# Patient Record
Sex: FEMALE | Race: ASIAN
[De-identification: names, ages, dates, MRNs, and addresses within clinical notes are randomized per-mention and may not be internally consistent; named-entity substitution may affect disease eponyms.]

---

## 2024-06-10 PROBLEM — Z00.00 ENCOUNTER FOR PREVENTIVE HEALTH EXAMINATION: Status: ACTIVE | Noted: 2024-06-10

## 2024-06-24 ENCOUNTER — APPOINTMENT (OUTPATIENT)
Dept: ANTEPARTUM | Facility: CLINIC | Age: 19
End: 2024-06-24

## 2024-06-24 ENCOUNTER — LABORATORY RESULT (OUTPATIENT)
Age: 19
End: 2024-06-24

## 2024-06-24 ENCOUNTER — ASOB RESULT (OUTPATIENT)
Age: 19
End: 2024-06-24

## 2024-06-24 PROCEDURE — 76801 OB US < 14 WKS SINGLE FETUS: CPT | Mod: 59

## 2024-06-24 PROCEDURE — 76813 OB US NUCHAL MEAS 1 GEST: CPT

## 2024-06-24 PROCEDURE — 36415 COLL VENOUS BLD VENIPUNCTURE: CPT

## 2024-08-12 ENCOUNTER — APPOINTMENT (OUTPATIENT)
Dept: ANTEPARTUM | Facility: CLINIC | Age: 19
End: 2024-08-12
Payer: COMMERCIAL

## 2024-08-12 ENCOUNTER — ASOB RESULT (OUTPATIENT)
Age: 19
End: 2024-08-12

## 2024-08-12 ENCOUNTER — NON-APPOINTMENT (OUTPATIENT)
Age: 19
End: 2024-08-12

## 2024-08-12 PROCEDURE — 76805 OB US >/= 14 WKS SNGL FETUS: CPT

## 2025-01-03 ENCOUNTER — APPOINTMENT (OUTPATIENT)
Dept: ANTEPARTUM | Facility: CLINIC | Age: 20
End: 2025-01-03

## 2025-01-03 ENCOUNTER — OUTPATIENT (OUTPATIENT)
Dept: INPATIENT UNIT | Facility: HOSPITAL | Age: 20
LOS: 1 days | Discharge: HOME | End: 2025-01-03
Payer: COMMERCIAL

## 2025-01-03 VITALS
TEMPERATURE: 99 F | HEART RATE: 93 BPM | SYSTOLIC BLOOD PRESSURE: 120 MMHG | RESPIRATION RATE: 15 BRPM | DIASTOLIC BLOOD PRESSURE: 67 MMHG

## 2025-01-03 DIAGNOSIS — O26.899 OTHER SPECIFIED PREGNANCY RELATED CONDITIONS, UNSPECIFIED TRIMESTER: ICD-10-CM

## 2025-01-03 PROCEDURE — 59025 FETAL NON-STRESS TEST: CPT | Mod: 26

## 2025-01-03 PROCEDURE — 99212 OFFICE O/P EST SF 10 MIN: CPT | Mod: 25

## 2025-01-04 VITALS — DIASTOLIC BLOOD PRESSURE: 66 MMHG | SYSTOLIC BLOOD PRESSURE: 102 MMHG | HEART RATE: 99 BPM

## 2025-01-04 NOTE — OB PROVIDER TRIAGE NOTE - HISTORY OF PRESENT ILLNESS
20 y/o  at 39w5d GA, SUZANNE 25, presenting with c/o contractions x4 hours.  Patient states that she was no sure if contractions were travis jules or not so she came in for evaluation.  Patient states that she feels contractions q5 mins, rates pain 7 out of 10 in severity.  Endorses good fetal movement. Denies: fever, vaginal bleeding, leakage of fluid, abdominal pain, nausea/vomiting.    Primary OB care with [facility]  Patient was seen in the office on , was 1cm dilated at that time  Uncomplicated pregnancy course  GBS: negative

## 2025-01-04 NOTE — OB PROVIDER TRIAGE NOTE - NSHPPHYSICALEXAM_GEN_ALL_CORE
ICU Vital Signs Last 24 Hrs  T(C): 37.1 (04 Jan 2025 00:29), Max: 37.1 (03 Jan 2025 23:40)  T(F): 98.78 (04 Jan 2025 00:29), Max: 98.8 (03 Jan 2025 23:40)  HR: 105 (04 Jan 2025 00:33) (93 - 105)  BP: 111/71 (04 Jan 2025 00:33) (111/71 - 120/67)  RR: 15 (04 Jan 2025 00:29) (15 - 15)    General: Patient sitting comfortably in bed, A&Ox3, in no acute distress  Abd: soft, non-tender, gravid, TOCO in place  Bedside Transabdominal Sonogram: Vertex presentation, posterior placenta, +FH noted, M mode 155 BPM, TIFFANI 15.22cm  EFM: 140 BPM baseline, moderate variability, +accels, - decels, category I tracing, reactive NST  [place]: Irregular contractions  SVE: 1/50/-3  Ext:  FROM bilateral no edema  Neuro: grossly intact

## 2025-01-04 NOTE — OB PROVIDER TRIAGE NOTE - NSOBPROVIDERNOTE_OBGYN_ALL_OB_FT
A: 18 y/o  @ 39w5d GA in early latent labor. GBS negative.    P: No evidence of acute process at this time     SVE unchanged from previous exam     Patient comfortable, in no acute distress     BPP  TIFFANI 15.22cm     EFM: Cat I tracing, reactive NST     [place]: Irregular ctx     Reassuring fetal/maternal status     Stable for discharge     Return precautions provided    Discharged @ 0121  - Patient to be discharged home with follow up and return precautions  - Please follow up with your obstetrician at your next scheduled appointment.   - Please return for decreased / no fetal movement, vaginal bleeding similar to that of a period, leaking / gush of fluid, regular contractions occurring 4-5 minutes  for one hour or requiring pain medication   - Patient educated of plan and demonstrates understanding. All questions answered. Discharge instructions provided and signed    Case discussed with Dr Ivis Laurent PA-C

## 2025-01-04 NOTE — OB PROVIDER TRIAGE NOTE - NS_OBGYNHISTORY_OBGYN_ALL_OB_FT
OB History: Primigravida    GYN History: Denies history of endometriosis/uterine fibroids/cysts/abnormal pap smears/STIs.

## 2025-01-06 ENCOUNTER — APPOINTMENT (OUTPATIENT)
Dept: ANTEPARTUM | Facility: CLINIC | Age: 20
End: 2025-01-06
Payer: COMMERCIAL

## 2025-01-06 ENCOUNTER — TRANSCRIPTION ENCOUNTER (OUTPATIENT)
Age: 20
End: 2025-01-06

## 2025-01-06 ENCOUNTER — INPATIENT (INPATIENT)
Facility: HOSPITAL | Age: 20
LOS: 1 days | Discharge: HOME | End: 2025-01-08
Attending: OBSTETRICS & GYNECOLOGY | Admitting: OBSTETRICS & GYNECOLOGY

## 2025-01-06 ENCOUNTER — ASOB RESULT (OUTPATIENT)
Age: 20
End: 2025-01-06

## 2025-01-06 VITALS — TEMPERATURE: 98 F

## 2025-01-06 DIAGNOSIS — O26.899 OTHER SPECIFIED PREGNANCY RELATED CONDITIONS, UNSPECIFIED TRIMESTER: ICD-10-CM

## 2025-01-06 DIAGNOSIS — O42.90 PREMATURE RUPTURE OF MEMBRANES, UNSPECIFIED AS TO LENGTH OF TIME BETWEEN RUPTURE AND ONSET OF LABOR, UNSPECIFIED WEEKS OF GESTATION: ICD-10-CM

## 2025-01-06 LAB
BASOPHILS # BLD AUTO: 0.01 K/UL — SIGNIFICANT CHANGE UP (ref 0–0.2)
BASOPHILS NFR BLD AUTO: 0.1 % — SIGNIFICANT CHANGE UP (ref 0–2)
BLD GP AB SCN SERPL QL: NEGATIVE — SIGNIFICANT CHANGE UP
EOSINOPHIL # BLD AUTO: 0.11 K/UL — SIGNIFICANT CHANGE UP (ref 0–0.5)
EOSINOPHIL NFR BLD AUTO: 1.2 % — SIGNIFICANT CHANGE UP (ref 0–6)
HCT VFR BLD CALC: 31.4 % — LOW (ref 34.5–45)
HGB BLD-MCNC: 9.2 G/DL — LOW (ref 11.5–15.5)
IANC: 6.14 K/UL — SIGNIFICANT CHANGE UP (ref 1.8–7.4)
IMM GRANULOCYTES NFR BLD AUTO: 0.7 % — SIGNIFICANT CHANGE UP (ref 0–0.9)
LYMPHOCYTES # BLD AUTO: 1.97 K/UL — SIGNIFICANT CHANGE UP (ref 1–3.3)
LYMPHOCYTES # BLD AUTO: 21 % — SIGNIFICANT CHANGE UP (ref 13–44)
MCHC RBC-ENTMCNC: 19.2 PG — LOW (ref 27–34)
MCHC RBC-ENTMCNC: 29.3 G/DL — LOW (ref 32–36)
MCV RBC AUTO: 65.4 FL — LOW (ref 80–100)
MONOCYTES # BLD AUTO: 1.09 K/UL — HIGH (ref 0–0.9)
MONOCYTES NFR BLD AUTO: 11.6 % — SIGNIFICANT CHANGE UP (ref 2–14)
NEUTROPHILS # BLD AUTO: 6.14 K/UL — SIGNIFICANT CHANGE UP (ref 1.8–7.4)
NEUTROPHILS NFR BLD AUTO: 65.4 % — SIGNIFICANT CHANGE UP (ref 43–77)
NRBC # BLD: 0 /100 WBCS — SIGNIFICANT CHANGE UP (ref 0–0)
NRBC # FLD: 0 K/UL — SIGNIFICANT CHANGE UP (ref 0–0)
PLATELET # BLD AUTO: 253 K/UL — SIGNIFICANT CHANGE UP (ref 150–400)
RBC # BLD: 4.8 M/UL — SIGNIFICANT CHANGE UP (ref 3.8–5.2)
RBC # FLD: 17.4 % — HIGH (ref 10.3–14.5)
RH IG SCN BLD-IMP: POSITIVE — SIGNIFICANT CHANGE UP
RH IG SCN BLD-IMP: POSITIVE — SIGNIFICANT CHANGE UP
T PALLIDUM AB TITR SER: NEGATIVE — SIGNIFICANT CHANGE UP
WBC # BLD: 9.39 K/UL — SIGNIFICANT CHANGE UP (ref 3.8–10.5)
WBC # FLD AUTO: 9.39 K/UL — SIGNIFICANT CHANGE UP (ref 3.8–10.5)

## 2025-01-06 PROCEDURE — 76815 OB US LIMITED FETUS(S): CPT | Mod: 26

## 2025-01-06 RX ORDER — SIMETHICONE 125 MG/1
80 CAPSULE, LIQUID FILLED ORAL EVERY 4 HOURS
Refills: 0 | Status: DISCONTINUED | OUTPATIENT
Start: 2025-01-06 | End: 2025-01-08

## 2025-01-06 RX ORDER — OXYTOCIN IN 5 % DEXTROSE 20/500ML
167 PLASTIC BAG, INJECTION (ML) INTRAVENOUS
Qty: 30 | Refills: 0 | Status: DISCONTINUED | OUTPATIENT
Start: 2025-01-06 | End: 2025-01-06

## 2025-01-06 RX ORDER — CHLORHEXIDINE GLUCONATE 1.2 MG/ML
1 RINSE ORAL DAILY
Refills: 0 | Status: DISCONTINUED | OUTPATIENT
Start: 2025-01-06 | End: 2025-01-06

## 2025-01-06 RX ORDER — FERROUS SULFATE 325(65) MG
0 TABLET ORAL
Refills: 0 | DISCHARGE

## 2025-01-06 RX ORDER — IBUPROFEN 200 MG
1 TABLET ORAL
Qty: 0 | Refills: 0 | DISCHARGE
Start: 2025-01-06

## 2025-01-06 RX ORDER — ACETAMINOPHEN 80 MG/.8ML
3 SOLUTION/ DROPS ORAL
Qty: 0 | Refills: 0 | DISCHARGE
Start: 2025-01-06

## 2025-01-06 RX ORDER — ACETAMINOPHEN 80 MG/.8ML
975 SOLUTION/ DROPS ORAL
Refills: 0 | Status: DISCONTINUED | OUTPATIENT
Start: 2025-01-06 | End: 2025-01-08

## 2025-01-06 RX ORDER — HYDROCORTISONE 1 %
1 CREAM (GRAM) TOPICAL EVERY 6 HOURS
Refills: 0 | Status: DISCONTINUED | OUTPATIENT
Start: 2025-01-06 | End: 2025-01-08

## 2025-01-06 RX ORDER — OXYCODONE HCL 15 MG
5 TABLET ORAL ONCE
Refills: 0 | Status: DISCONTINUED | OUTPATIENT
Start: 2025-01-06 | End: 2025-01-08

## 2025-01-06 RX ORDER — WITCH HAZEL 0.5 ML/ML
1 SOLUTION TOPICAL
Qty: 0 | Refills: 0 | DISCHARGE
Start: 2025-01-06

## 2025-01-06 RX ORDER — OXYTOCIN IN 5 % DEXTROSE 20/500ML
10 PLASTIC BAG, INJECTION (ML) INTRAVENOUS ONCE
Refills: 0 | Status: COMPLETED | OUTPATIENT
Start: 2025-01-06 | End: 2025-01-06

## 2025-01-06 RX ORDER — SODIUM CHLORIDE 9 MG/ML
500 INJECTION, SOLUTION INTRAVENOUS ONCE
Refills: 0 | Status: DISCONTINUED | OUTPATIENT
Start: 2025-01-06 | End: 2025-01-08

## 2025-01-06 RX ORDER — WITCH HAZEL 0.5 ML/ML
1 SOLUTION TOPICAL EVERY 4 HOURS
Refills: 0 | Status: DISCONTINUED | OUTPATIENT
Start: 2025-01-06 | End: 2025-01-08

## 2025-01-06 RX ORDER — OXYTOCIN IN 5 % DEXTROSE 20/500ML
PLASTIC BAG, INJECTION (ML) INTRAVENOUS
Qty: 30 | Refills: 0 | Status: DISCONTINUED | OUTPATIENT
Start: 2025-01-06 | End: 2025-01-06

## 2025-01-06 RX ORDER — PNV/FERROUS FUM/DOCUSATE/FOLIC 90-50-1MG
0 TABLET, EXTENDED RELEASE ORAL
Refills: 0 | DISCHARGE

## 2025-01-06 RX ORDER — IBUPROFEN 200 MG
600 TABLET ORAL EVERY 6 HOURS
Refills: 0 | Status: COMPLETED | OUTPATIENT
Start: 2025-01-06 | End: 2025-12-05

## 2025-01-06 RX ORDER — PNV/FERROUS FUM/DOCUSATE/FOLIC 90-50-1MG
1 TABLET, EXTENDED RELEASE ORAL DAILY
Refills: 0 | Status: DISCONTINUED | OUTPATIENT
Start: 2025-01-06 | End: 2025-01-08

## 2025-01-06 RX ORDER — BENZOCAINE/MENTH/CETYLPYRD CL 15 MG-4 MG
1 LOZENGE MUCOUS MEMBRANE EVERY 6 HOURS
Refills: 0 | Status: DISCONTINUED | OUTPATIENT
Start: 2025-01-06 | End: 2025-01-08

## 2025-01-06 RX ORDER — DIPHENHYDRAMINE HCL 25 MG
25 TABLET ORAL EVERY 6 HOURS
Refills: 0 | Status: DISCONTINUED | OUTPATIENT
Start: 2025-01-06 | End: 2025-01-08

## 2025-01-06 RX ORDER — LANOLIN
1 OINTMENT (GRAM) TOPICAL EVERY 6 HOURS
Refills: 0 | Status: DISCONTINUED | OUTPATIENT
Start: 2025-01-06 | End: 2025-01-08

## 2025-01-06 RX ORDER — IRON ASPGLY,PS/C/B12/FA/CA/SUC 150-25-1
1 CAPSULE ORAL
Refills: 0 | DISCHARGE

## 2025-01-06 RX ORDER — SODIUM CHLORIDE 9 MG/ML
3 INJECTION, SOLUTION INTRAMUSCULAR; INTRAVENOUS; SUBCUTANEOUS EVERY 8 HOURS
Refills: 0 | Status: DISCONTINUED | OUTPATIENT
Start: 2025-01-06 | End: 2025-01-08

## 2025-01-06 RX ORDER — KETOROLAC TROMETHAMINE 30 MG/ML
30 INJECTION INTRAMUSCULAR; INTRAVENOUS ONCE
Refills: 0 | Status: DISCONTINUED | OUTPATIENT
Start: 2025-01-06 | End: 2025-01-06

## 2025-01-06 RX ORDER — CLOSTRIDIUM TETANI TOXOID ANTIGEN (FORMALDEHYDE INACTIVATED), CORYNEBACTERIUM DIPHTHERIAE TOXOID ANTIGEN (FORMALDEHYDE INACTIVATED), BORDETELLA PERTUSSIS TOXOID ANTIGEN (GLUTARALDEHYDE INACTIVATED), BORDETELLA PERTUSSIS FILAMENTOUS HEMAGGLUTININ ANTIGEN (FORMALDEHYDE INACTIVATED), BORDETELLA PERTUSSIS PERTACTIN ANTIGEN, AND BORDETELLA PERTUSSIS FIMBRIAE 2/3 ANTIGEN 5; 2; 2.5; 5; 3; 5 [LF]/.5ML; [LF]/.5ML; UG/.5ML; UG/.5ML; UG/.5ML; UG/.5ML
0.5 INJECTION, SUSPENSION INTRAMUSCULAR ONCE
Refills: 0 | Status: DISCONTINUED | OUTPATIENT
Start: 2025-01-06 | End: 2025-01-08

## 2025-01-06 RX ORDER — MAGNESIUM HYDROXIDE 400 MG/5ML
30 SUSPENSION, ORAL (FINAL DOSE FORM) ORAL
Refills: 0 | Status: DISCONTINUED | OUTPATIENT
Start: 2025-01-06 | End: 2025-01-08

## 2025-01-06 RX ORDER — OXYCODONE HCL 15 MG
5 TABLET ORAL
Refills: 0 | Status: DISCONTINUED | OUTPATIENT
Start: 2025-01-06 | End: 2025-01-08

## 2025-01-06 RX ORDER — SODIUM CHLORIDE 9 MG/ML
1000 INJECTION, SOLUTION INTRAVENOUS
Refills: 0 | Status: DISCONTINUED | OUTPATIENT
Start: 2025-01-06 | End: 2025-01-06

## 2025-01-06 RX ORDER — OXYTOCIN IN 5 % DEXTROSE 20/500ML
167 PLASTIC BAG, INJECTION (ML) INTRAVENOUS
Qty: 30 | Refills: 0 | Status: COMPLETED | OUTPATIENT
Start: 2025-01-06 | End: 2025-01-06

## 2025-01-06 RX ADMIN — SODIUM CHLORIDE 125 MILLILITER(S): 9 INJECTION, SOLUTION INTRAVENOUS at 07:59

## 2025-01-06 RX ADMIN — Medication 10 UNIT(S): at 16:48

## 2025-01-06 RX ADMIN — Medication 2 MILLIUNIT(S)/MIN: at 11:11

## 2025-01-06 RX ADMIN — CHLORHEXIDINE GLUCONATE 1 APPLICATION(S): 1.2 RINSE ORAL at 08:00

## 2025-01-06 RX ADMIN — Medication 167 MILLIUNIT(S)/MIN: at 16:48

## 2025-01-06 NOTE — OB PROVIDER H&P - HISTORY OF PRESENT ILLNESS
20y/o  @40.1wks presents with leaking of clear fluid since 350am, and cramping, pain scale 10/10  Reports good fetal movement  Denies VB    Allergies: Almonds- SOB  Medications: PNV, Iron  NPO 12am

## 2025-01-06 NOTE — OB RN PATIENT PROFILE - FALL HARM RISK - PATIENT NEEDS ASSISTANCE
POC reviewed with mom. Questions answered, verbalized understanding.     Resp status stable on home Astral vent. Pt breathing comfortably with no increased WOB noted. Requiring minimal suctioning. Positional airleak noted. Coarse breath sounds throughout. Plan is to possibly trial HME tomorrow. Environmentally warm throughout shift with tmax in the 99-100F range. Pt cools down with fan application and unswaddling. Pt irritable in the beginning of the shift but settled as the day went on. Prn tylenol x1 for irritability. MARISOL scores of 1 for time to gain calm state. Weaned ativan dose to 0.46mg--pt tolerated well. Continuing methadone q8. VSS. Perfusion adequate. Bumex and diuril continued q8, pt voiding well. Feeds of monogen 26kcal with 4meq NaCl and 2meq Kcl continued. Multiple BM throughout shift. Prn simethicone x1 for gas. Plan is for parents to room in this weekend. See flowsheets for more assessment info.    No assistance needed

## 2025-01-06 NOTE — DISCHARGE NOTE OB - PATIENT PORTAL LINK FT
You can access the FollowMyHealth Patient Portal offered by [facility] by registering at the following website: [email or web address]. By joining [employer]’s FollowMyHealth portal, you will also be able to view your health information using other applications (apps) compatible with our system.

## 2025-01-06 NOTE — OB PROVIDER DELIVERY SUMMARY - NSSELHIDDEN_OBGYN_ALL_OB_FT
[NS_DeliveryAttending1_OBGYN_ALL_OB_FT:[id number]=],[NS_DeliveryRN_OBGYN_ALL_OB_FT:[id number]=],[NS_DeliveryAssist1_OBGYN_ALL_OB_FT:[id number]]

## 2025-01-06 NOTE — OB PROVIDER LABOR PROGRESS NOTE - ASSESSMENT
CNM OB Progress Note    Patient seen and evaluated at bedside.  Denies complaints.  Comfortable w/ anesthesia epidural.      T(C): 37.0 (01-06-25 @ 14:00), Max: 37.2 (01-06-25 @ 10:00)  HR: 95 (01-06-25 @ 15:14) (78 - 107)  BP: 120/66 (01-06-25 @ 14:40) (86/58 - 125/83)  RR: 14 (01-06-25 @ 14:00) (14 - 18)  SpO2: 100% (01-06-25 @ 15:14) (99% - 100%)    SVE: 3/90/-2       -Labor: intermittent cat 2 tracing with periods of minimal variability  -Analgesia: anesthesia epidural  -Induction with: iv pitocin    pitocin at 6u, unable to increase pitocin for ctx pattern  IUPC placement discussed with patient, agree to plan  IUPC Placed  Reposition PRN  fluids remain clear      -Continue to monitor with EFM & IUPC  -Recheck patient in 2-4 hours or PRN    Discussed with MD Juan Daniel Dougherty

## 2025-01-06 NOTE — OB PROVIDER DELIVERY SUMMARY - NSPROVIDERDELIVERYNOTE_OBGYN_ALL_OB_FT
Patient found to be fully dilated and directed to push.  Spontaneous vaginal delivery of viable liveborn female infant.   Head, shoulders, and body delivered without complications.  Infant was suctioned and passed to mother.   Delayed cord clamping performed, then clamped and cut.   Placenta delivered intact with a 3-vessel cord.   Fundal massage was given and uterine fundus was noted to have atony, vigorous bimanual fundal massage given, clots expressed, intermittent atony noted. IM Pitocin given, Pitocin bolus infusing. Uterus noted to be firm.   Vaginal exam revealed an intact cervix, vaginal walls, and sulci.   Patient has a 1st degree laceration that was repaired with 2-0 chromic suture.   Excellent hemostasis noted.  Patient was stable and started postpartum recovery in room.   Count was correct x 2.     Infant: female  Apgar: 9/9  QBL: 287ml

## 2025-01-06 NOTE — OB PROVIDER H&P - PROBLEM SELECTOR PLAN 1
Admit for PROM @40.1wks  D/W Dr. Garcia  Routine Orders  IOL Buccal Cytotec  Epidural as needed for pain   GBS Negative 2024    Risks, benefits, alternatives, and possible complications have been discussed in detail with the patient in her native language. Pre-admission, admission, and post admission procedures and expectations were discussed in detail. All questions answered, all appropriate hospital consents were signed. Anticipate normal vaginal delivery.   Informed consent was obtained. The following was discussed:   - Induction/augmentation of labor: use of medication and/or cook balloon to begin or enhance labor   - Obstetrical management including internal fetal/contraction monitoring   - Normal vaginal delivery   - Possible  section
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
## 2025-01-06 NOTE — OB RN PATIENT PROFILE - DOES PATIENT HAVE ADVANCE DIRECTIVE
Patient:  Dalia Bearden [facility] #:  [id number]   Date of Note: 04/24/2023   Referring Physician:  Mary Siddiqi PA-C  Diagnosis:    Encounter Diagnosis   Name Primary?    Chronic left shoulder pain Yes     Progress/Treatment note     Start Time: 2:05 pm  End Time: 2:59 pm  Total Time: 54 min    Subjective:   Pt reporting she is doing a lot better than Friday.     Pain: 4 out of 10 left shoulder    Objective:   Patient seen by ALBERT this session. Treatment  consist of the following: manual therapy/joint mobilization, Therapeutic exercises, Therapeutic activities, and Neuromuscular re-education    Treatment:    -Reassessment  - Finger ladder x 10 abduction top increase ROM  - Wall clocks with red theraband x 10 each, increase stabilization and strength of musculature  - Shoulder arc with use of 1.5# cuff wt x 3 each with left shoulder to increase strength  - ABC with 2# wt with LUE, increase stabilization and strength of musculature  - 6 min omnicycle level 5 (3.5 forward and 3.5 min backwards)   -isometric ball on wall 6 way 2 x 10 each direction  -2lb ball on wall rotations in shoulder flexion and abduction 30 each direction    Assessment:  Patient reporting no pain or problems this day during therapy interventions. Pt tolerating interventions well without reports of pain with rest breaks throughout. Pt able to complete all ax without v/c for proper form and positioning.Occupational therapist /ALBERT collaboration to discuss POC, improvements, and d/c planning on todays date. Patient reports compliant with home exercise program.     Pt will continue to benefit from skilled OT intervention. Patient continues to demonstrate limitation  with  ROM, Joint mobility, Stiffness, Decreased functional use, Decreased strength, Continued pain, and Continued inflammation     Goals to be met in 6 weeks:  1) Pt will be independent and report performing HEP to maximize (L) shoulder functional capacity. Ongoing/ MET  2) Pt  will increase L shoulder AROM in all measured planes of motion by 5-10 degrees with daily task. Ongoing/ MET  3) Pt will increase ability to move/handle objects AEB decreased score on QuickDASH <=40.9% (currently 27.3%) Ongoing/ MET  4) Pt will report one degree less of pain with use. Ongoing/ MET  5) Pt will report use of home modalities for pain management. Ongoing/ MET     New goals to be completed in 6 weeks:  1) Pt will be independent and report performing HEP to maximize (L) shoulder functional capacity.   2) Pt will increase L shoulder AROM in all measured planes of motion by 3-5 degrees with daily task.   3) Pt will increase ability to move/handle objects AEB decreased score on QuickDASH <=20.5% (currently 27.3%)   4) Pt will report one degree less of pain with use.   5) Pt will report use of home modalities for pain management.     Plan  Dalia to be treated by Occupational Therapy 3 times per week for 6 weeks to achieve the established goals.   Treatment to include Ultrasoud @ 3mHz, AAROM Mobilization of GH joint, PROM Home program, Ice, Moist heat, Strengthening Theraband Ex, UBE , and E- stim as well as any other treatments deemed necessary based on the patient's needs or progress.      Certification Dates: 4/21/2023 - 6/2/2023   Yes

## 2025-01-06 NOTE — DISCHARGE NOTE OB - CARE PROVIDER_API CALL
Nellie Frances  Obstetrics and Gynecology  [id number] [address] [place] [address], [address] [address]  Phone: [phone]  Fax: [phone]  Follow Up Time:

## 2025-01-06 NOTE — OB RN PATIENT PROFILE - EDUCATION ON THE POTENTIAL RISKS AND IMPACT OF EARLY USE OF PACIFIERS ON THE ESTABLISHMENT OF BREASTFEEDING
Procedure Date: 2020      SURGEON:  Naren Reece MD      PREOPERATIVE DIAGNOSIS:  Left tension pneumothorax.      POSTOPERATIVE DIAGNOSIS:  Left tension pneumothorax.      PROCEDURE:  Left tube thoracostomy, 20-Gibraltarian.      INDICATIONS:  The patient is an 79-year-old gentleman with COVID.  He is in respiratory failure.  He developed tension pneumothorax on the left side.  He has some relative hypotension and hypoxemia.  Based on the findings, a tube thoracostomy is indicated.      DESCRIPTION OF PROCEDURE:  First, a 16-gauge Angiocath was placed to decompress the pneumothorax.  Then the patient was placed in a supine position with the left side slightly elevated.  Left chest was prepared and draped in the usual fashion with ChloraPrep.  A small incision was made at the level of the 6th rib at the anterior axillary line.  The pleural space was entered.  Air was evacuated.  A 20-Gibraltarian chest tube was advanced.  The chest tube was sutured to skin with #1 silk suture.  Dressing was applied.  Chest tube was connected to the suction device with some air leak, as expected.  A chest x-ray showed the chest tube was in excellent position, lungs were expanded.  The patient tolerated the procedure well.         NAREN REECE MD             D: 2020   T: 2020   MT: KELL      Name:     IRIS ALCALA   MRN:      [id number]        Account:        [id number]   :      1941           Procedure Date: 2020      Document: [id number]     Statement Selected

## 2025-01-06 NOTE — DISCHARGE NOTE OB - FINANCIAL ASSISTANCE
[facility] provides services at a reduced cost to those who are determined to be eligible through [facility]’s financial assistance program. Information regarding [facility]’s financial assistance program can be found by going to [email or web address] or by calling [phone].

## 2025-01-06 NOTE — OB PROVIDER H&P - NSHPPHYSICALEXAM_GEN_ALL_CORE
Vital Signs Last 24 Hrs  T(C): 36.7 (06 Jan 2025 06:08), Max: 36.7 (06 Jan 2025 05:46)  T(F): 98.1 (06 Jan 2025 06:08), Max: 98.1 (06 Jan 2025 06:08)  HR: 84 (06 Jan 2025 06:08) (84 - 84)  BP: 116/75 (06 Jan 2025 06:08) (116/75 - 116/75)  RR: 18 (06 Jan 2025 06:08) (18 - 18)    Assessment reveals VSS  General: Female sitting comfortably in no apparent distress  Neuro: No facial asymmetry, no slurred speech, moves all 4 extremities  Mood: Alert and lucid, appropriate mood and affect  A&Ox3  Lungs- clear bilateral  Heart- normal rate and regular rhythm  Extremities- Warm, Dry, no edema present, good pulses   Abdomen soft, NT, gravid  Cat 1 tracing reactive, ctx every 6-8mins  Transabdominal Ultrasound- images saved ASOB, vtx, ant placenta, mvp4.77  Sterile Speculum- positive pooling, positive ferning, positive nitrazine   Vaginal Exam- 1/50/-3        PLAN: admit for prom

## 2025-01-06 NOTE — OB RN DELIVERY SUMMARY - NSSELHIDDEN_OBGYN_ALL_OB_FT
[NS_DeliveryAttending1_OBGYN_ALL_OB_FT:[id number]],[NS_DeliveryRN_OBGYN_ALL_OB_FT:[id number]]

## 2025-01-06 NOTE — OB PROVIDER H&P - NS_PLACENTALOCAL_OBGYN_ALL_OB
Problem: Prexisting or High Potential for Compromised Skin Integrity  Goal: Skin integrity is maintained or improved  Description: INTERVENTIONS:  - Identify patients at risk for skin breakdown  - Assess and monitor skin integrity  - Assess and monitor nutrition and hydration status  - Monitor labs   - Assess for incontinence   - Turn and reposition patient  - Assist with mobility/ambulation  - Relieve pressure over bony prominences  - Avoid friction and shearing  - Provide appropriate hygiene as needed including keeping skin clean and dry  - Evaluate need for skin moisturizer/barrier cream  - Collaborate with interdisciplinary team   - Patient/family teaching  - Consider wound care consult   Outcome: Progressing     Problem: MOBILITY - ADULT  Goal: Maintain or return to baseline ADL function  Description: INTERVENTIONS:  -  Assess patient's ability to carry out ADLs; assess patient's baseline for ADL function and identify physical deficits which impact ability to perform ADLs (bathing, care of mouth/teeth, toileting, grooming, dressing, etc.)  - Assess/evaluate cause of self-care deficits   - Assess range of motion  - Assess patient's mobility; develop plan if impaired  - Assess patient's need for assistive devices and provide as appropriate  - Encourage maximum independence but intervene and supervise when necessary  - Involve family in performance of ADLs  - Assess for home care needs following discharge   - Consider OT consult to assist with ADL evaluation and planning for discharge  - Provide patient education as appropriate  Outcome: Progressing  Goal: Maintains/Returns to pre admission functional level  Description: INTERVENTIONS:  - Perform BMAT or MOVE assessment daily.   - Set and communicate daily mobility goal to care team and patient/family/caregiver. - Collaborate with rehabilitation services on mobility goals if consulted  - Perform Range of Motion 3 times a day.   - Reposition patient every 2 hours.  - Dangle patient 3 times a day  - Stand patient 3 times a day  - Ambulate patient 3 times a day  - Out of bed to chair 3 times a day   - Out of bed for meals 3 times a day  - Out of bed for toileting  - Record patient progress and toleration of activity level   Outcome: Progressing     Problem: PAIN - ADULT  Goal: Verbalizes/displays adequate comfort level or baseline comfort level  Description: Interventions:  - Encourage patient to monitor pain and request assistance  - Assess pain using appropriate pain scale  - Administer analgesics based on type and severity of pain and evaluate response  - Implement non-pharmacological measures as appropriate and evaluate response  - Consider cultural and social influences on pain and pain management  - Notify physician/advanced practitioner if interventions unsuccessful or patient reports new pain  Outcome: Progressing     Problem: INFECTION - ADULT  Goal: Absence or prevention of progression during hospitalization  Description: INTERVENTIONS:  - Assess and monitor for signs and symptoms of infection  - Monitor lab/diagnostic results  - Monitor all insertion sites, i.e. indwelling lines, tubes, and drains  - Monitor endotracheal if appropriate and nasal secretions for changes in amount and color  - [place] appropriate cooling/warming therapies per order  - Administer medications as ordered  - Instruct and encourage patient and family to use good hand hygiene technique  - Identify and instruct in appropriate isolation precautions for identified infection/condition  Outcome: Progressing  Goal: Absence of fever/infection during neutropenic period  Description: INTERVENTIONS:  - Monitor WBC    Outcome: Progressing     Problem: SAFETY ADULT  Goal: Patient will remain free of falls  Description: INTERVENTIONS:  - Educate patient/family on patient safety including physical limitations  - Instruct patient to call for assistance with activity   - Consult OT/PT to assist with strengthening/mobility   - Keep Call bell within reach  - Keep bed low and locked with side rails adjusted as appropriate  - Keep care items and personal belongings within reach  - Initiate and maintain comfort rounds  - Make Fall Risk Sign visible to staff  - Offer Toileting every 2 Hours, in advance of need  - Initiate/Maintain alarm  - Obtain necessary fall risk management equipment:   - Apply yellow socks and bracelet for high fall risk patients  - Consider moving patient to room near nurses station  Outcome: Progressing     Problem: DISCHARGE PLANNING  Goal: Discharge to home or other facility with appropriate resources  Description: INTERVENTIONS:  - Identify barriers to discharge w/patient and caregiver  - Arrange for needed discharge resources and transportation as appropriate  - Identify discharge learning needs (meds, wound care, etc.)  - Arrange for interpretive services to assist at discharge as needed  - Refer to Case Management Department for coordinating discharge planning if the patient needs post-hospital services based on physician/advanced practitioner order or complex needs related to functional status, cognitive ability, or social support system  Outcome: Progressing     Problem: Knowledge Deficit  Goal: Patient/family/caregiver demonstrates understanding of disease process, treatment plan, medications, and discharge instructions  Description: Complete learning assessment and assess knowledge base.   Interventions:  - Provide teaching at level of understanding  - Provide teaching via preferred learning methods  Outcome: Progressing     Problem: CARDIOVASCULAR - ADULT  Goal: Maintains optimal cardiac output and hemodynamic stability  Description: INTERVENTIONS:  - Monitor I/O, vital signs and rhythm  - Monitor for S/S and trends of decreased cardiac output  - Administer and titrate ordered vasoactive medications to optimize hemodynamic stability  - Assess quality of pulses, skin color and temperature  - Assess for signs of decreased coronary artery perfusion  - Instruct patient to report change in severity of symptoms  Outcome: Progressing  Goal: Absence of cardiac dysrhythmias or at baseline rhythm  Description: INTERVENTIONS:  - Continuous cardiac monitoring, vital signs, obtain 12 lead EKG if ordered  - Administer antiarrhythmic and heart rate control medications as ordered  - Monitor electrolytes and administer replacement therapy as ordered  Outcome: Progressing     Problem: GASTROINTESTINAL - ADULT  Goal: Maintains or returns to baseline bowel function  Description: INTERVENTIONS:  - Assess bowel function  - Encourage oral fluids to ensure adequate hydration  - Administer IV fluids if ordered to ensure adequate hydration  - Administer ordered medications as needed  - Encourage mobilization and activity  - Consider nutritional services referral to assist patient with adequate nutrition and appropriate food choices  Outcome: Progressing Anterior

## 2025-01-06 NOTE — OB RN PATIENT PROFILE - FALL HARM RISK - UNIVERSAL INTERVENTIONS
Bed in lowest position, wheels locked, appropriate side rails in place/Call bell, personal items and telephone in reach/Instruct patient to call for assistance before getting out of bed or chair/Non-slip footwear when patient is out of bed/[place] to call system/Physically safe environment - no spills, clutter or unnecessary equipment/Purposeful Proactive Rounding/Room/bathroom lighting operational, light cord in reach

## 2025-01-06 NOTE — OB RN TRIAGE NOTE - FALL HARM RISK - UNIVERSAL INTERVENTIONS
Bed in lowest position, wheels locked, appropriate side rails in place/Call bell, personal items and telephone in reach/Instruct patient to call for assistance before getting out of bed or chair/Non-slip footwear when patient is out of bed/[place] to call system/Physically safe environment - no spills, clutter or unnecessary equipment/Purposeful Proactive Rounding/Room/bathroom lighting operational, light cord in reach

## 2025-01-06 NOTE — OB PROVIDER H&P - NSLOWPPHRISK_OBGYN_A_OB
No previous uterine incision/[place] Pregnancy/Less than or equal to 4 previous vaginal births/No known bleeding disorder/No history of postpartum hemorrhage/No other PPH risks indicated

## 2025-01-06 NOTE — OB RN DELIVERY SUMMARY - NS_SEPSISRSKCALC_OBGYN_ALL_OB_FT
EOS calculated successfully. EOS Risk Factor: 0.5/1000 live births ([facility] national incidence); GA=40w1d; Temp=99; ROM=12.667; GBS='Negative'; Antibiotics='No antibiotics or any antibiotics < 2 hrs prior to birth'   EOS calculated successfully. EOS Risk Factor: 0.5/1000 live births ([facility] national incidence); GA=40w1d; Temp=101.1; ROM=12.667; GBS='Negative'; Antibiotics='No antibiotics or any antibiotics < 2 hrs prior to birth'

## 2025-01-07 DIAGNOSIS — O47.1 FALSE LABOR AT OR AFTER 37 COMPLETED WEEKS OF GESTATION: ICD-10-CM

## 2025-01-07 DIAGNOSIS — Z3A.39 39 WEEKS GESTATION OF PREGNANCY: ICD-10-CM

## 2025-01-07 LAB
BASOPHILS # BLD AUTO: 0.01 K/UL — SIGNIFICANT CHANGE UP (ref 0–0.2)
BASOPHILS NFR BLD AUTO: 0.1 % — SIGNIFICANT CHANGE UP (ref 0–2)
EOSINOPHIL # BLD AUTO: 0.04 K/UL — SIGNIFICANT CHANGE UP (ref 0–0.5)
EOSINOPHIL NFR BLD AUTO: 0.3 % — SIGNIFICANT CHANGE UP (ref 0–6)
HCT VFR BLD CALC: 29.3 % — LOW (ref 34.5–45)
HGB BLD-MCNC: 8.7 G/DL — LOW (ref 11.5–15.5)
IANC: 11.63 K/UL — HIGH (ref 1.8–7.4)
IMM GRANULOCYTES NFR BLD AUTO: 0.5 % — SIGNIFICANT CHANGE UP (ref 0–0.9)
LYMPHOCYTES # BLD AUTO: 13.5 % — SIGNIFICANT CHANGE UP (ref 13–44)
LYMPHOCYTES # BLD AUTO: 2.09 K/UL — SIGNIFICANT CHANGE UP (ref 1–3.3)
MCHC RBC-ENTMCNC: 19.5 PG — LOW (ref 27–34)
MCHC RBC-ENTMCNC: 29.7 G/DL — LOW (ref 32–36)
MCV RBC AUTO: 65.7 FL — LOW (ref 80–100)
MONOCYTES # BLD AUTO: 1.63 K/UL — HIGH (ref 0–0.9)
MONOCYTES NFR BLD AUTO: 10.5 % — SIGNIFICANT CHANGE UP (ref 2–14)
NEUTROPHILS # BLD AUTO: 11.63 K/UL — HIGH (ref 1.8–7.4)
NEUTROPHILS NFR BLD AUTO: 75.1 % — SIGNIFICANT CHANGE UP (ref 43–77)
NRBC # BLD: 0 /100 WBCS — SIGNIFICANT CHANGE UP (ref 0–0)
NRBC # FLD: 0 K/UL — SIGNIFICANT CHANGE UP (ref 0–0)
PLATELET # BLD AUTO: 262 K/UL — SIGNIFICANT CHANGE UP (ref 150–400)
RBC # BLD: 4.46 M/UL — SIGNIFICANT CHANGE UP (ref 3.8–5.2)
RBC # FLD: 17.7 % — HIGH (ref 10.3–14.5)
WBC # BLD: 15.48 K/UL — HIGH (ref 3.8–10.5)
WBC # FLD AUTO: 15.48 K/UL — HIGH (ref 3.8–10.5)

## 2025-01-07 RX ORDER — IBUPROFEN 200 MG
600 TABLET ORAL EVERY 6 HOURS
Refills: 0 | Status: DISCONTINUED | OUTPATIENT
Start: 2025-01-07 | End: 2025-01-08

## 2025-01-07 RX ORDER — IRON SUCROSE 20 MG/ML
200 INJECTION, SOLUTION INTRAVENOUS ONCE
Refills: 0 | Status: COMPLETED | OUTPATIENT
Start: 2025-01-07 | End: 2025-01-07

## 2025-01-07 RX ORDER — ASCORBIC ACID 1000 MG
500 TABLET ORAL DAILY
Refills: 0 | Status: DISCONTINUED | OUTPATIENT
Start: 2025-01-07 | End: 2025-01-08

## 2025-01-07 RX ORDER — FERROUS SULFATE 325(65) MG
325 TABLET ORAL DAILY
Refills: 0 | Status: DISCONTINUED | OUTPATIENT
Start: 2025-01-07 | End: 2025-01-08

## 2025-01-07 RX ADMIN — SODIUM CHLORIDE 3 MILLILITER(S): 9 INJECTION, SOLUTION INTRAMUSCULAR; INTRAVENOUS; SUBCUTANEOUS at 14:55

## 2025-01-07 RX ADMIN — Medication 1 APPLICATION(S): at 03:45

## 2025-01-07 RX ADMIN — IRON SUCROSE 110 MILLIGRAM(S): 20 INJECTION, SOLUTION INTRAVENOUS at 16:01

## 2025-01-07 NOTE — CHART NOTE - NSCHARTNOTEFT_GEN_A_CORE
s/p , QBL 287ml, 1st deg lac.       RN called me to say patient had a postpartum temp of 38.4c postpartum @ 515pm    Repeat PP Temp at 545pm was 37.8c    ------------------------------------------------------------------------------------    T(C): 37.8 (25 @ 17:45), Max: 38.4 (25 @ 17:15)  T(F): 100 (25 @ 17:45), Max: 101.1 (25 @ 17:15)  HR: 116 (25 @ 18:04) (78 - 166)  BP: 131/79 (25 @ 17:52) (86/58 - 149/73)  RR: 14 (25 @ 14:00) (14 - 18)  SpO2: 100% (25 @ 18:04) (63% - 100%)  Wt(kg): --      LABS:  cret                        9.2    9.39  )-----------( 253      ( 2025 06:30 )             31.4           ----------------------------------------------------------------------------------------        PLAN    1. no treatment at this time    2. po hydration encouraged    3. continue to monitor      Discussed with MD Juan Daniel Dougherty
Patient seen due to increased discomfort and numbness around IV site during Fe infusion.     She describes numbness that started during the infusion. After the infusion was disconnected, she started to feel tingling around her IV site in no particular nerve distribution. The area affected is 1cm surrounding the IV site. No erythema, edema, or visual changes around site. Full ROM of wrist.     However, as per nursing team, IV is infiltrated.     Explained to patient the importance of continuing IV iron infusions in the setting of the patient's current H/H after delivery. Patient understanding and agrees to proceed with IV iron infusion. Questions answered.     melody Tomlinson, PGY-1

## 2025-01-07 NOTE — PROGRESS NOTE ADULT - SUBJECTIVE AND OBJECTIVE BOX
Post-partum Note,   She is a  19y woman who is now post-partum day: 1    Subjective:  The patient feels well.  She is ambulating.   She is tolerating regular diet.  She denies nausea and vomiting; denies fever.  She is voiding.  Her pain is controlled.  She reports normal postpartum bleeding.  She denies abd tenderness.     Physical exam:    Vital Signs Last 24 Hrs  T(C): 36.9 (2025 10:40), Max: 38.4 (2025 17:15)  T(F): 98.4 (2025 10:40), Max: 101.1 (2025 17:15)  HR: 101 (2025 10:40) (79 - 166)  BP: 116/63 (2025 10:40) (86/58 - 149/73)  BP(mean): --  RR: 17 (2025 10:40) (14 - 18)  SpO2: 100% (2025 10:40) (63% - 100%)    Parameters below as of 2025 10:40  Patient On (Oxygen Delivery Method): room air        Gen: NAD  Breast: Soft, nontender, not engorged.  Abdomen: Soft, nontender, no distension , firm uterine fundus at umbilicus. No tenderness with deep palpation.   Pelvic: Normal lochia noted  Ext: No calf tenderness    LABS:                        8.7    15.48 )-----------( 262      ( 2025 05:35 )             29.3       Rubella status:     Allergies    No Known Drug Allergies  [place] (Short breath)    Intolerances      MEDICATIONS  (STANDING):  acetaminophen     Tablet .. 975 milliGRAM(s) Oral <User Schedule>  diphtheria/tetanus/pertussis (acellular) Vaccine (Adacel) 0.5 milliLiter(s) IntraMuscular once  ibuprofen  Tablet. 600 milliGRAM(s) Oral every 6 hours  iron sucrose IVPB 200 milliGRAM(s) IV Intermittent once  lactated ringers Bolus 500 milliLiter(s) IV Bolus once  prenatal multivitamin 1 Tablet(s) Oral daily  sodium chloride 0.9% lock flush 3 milliLiter(s) IV Push every 8 hours    MEDICATIONS  (PRN):  benzocaine 20%/menthol 0.5% Spray 1 Spray(s) Topical every 6 hours PRN for Perineal discomfort  dibucaine 1% Ointment 1 Application(s) Topical every 6 hours PRN Perineal discomfort  diphenhydrAMINE 25 milliGRAM(s) Oral every 6 hours PRN Pruritus  hydrocortisone 1% Cream 1 Application(s) Topical every 6 hours PRN Moderate Pain (4-6)  lanolin Ointment 1 Application(s) Topical every 6 hours PRN nipple soreness  magnesium hydroxide Suspension 30 milliLiter(s) Oral two times a day PRN Constipation  oxyCODONE    IR 5 milliGRAM(s) Oral every 3 hours PRN Moderate to Severe Pain (4-10)  oxyCODONE    IR 5 milliGRAM(s) Oral once PRN Moderate to Severe Pain (4-10)  pramoxine 1%/zinc 5% Cream 1 Application(s) Topical every 4 hours PRN Moderate Pain (4-6)  simethicone 80 milliGRAM(s) Chew every 4 hours PRN Gas  witch hazel Pads 1 Application(s) Topical every 4 hours PRN Perineal discomfort        Assessment and Plan  PPD #1 s/p . Pt tachycardic with elevated white count this am. One isolated maternal temp. No uterine tenderness elicited on exam.     Doing well.  Encourage ambulation.  Encourage breastfeeding.   PP & PPD Instructions reviewed.  PP educational materials provided.   AM CBC ordered to trend white count.   Maternal tachy seems to be improving.   As per Dr. Medina, will continue to monitor patient vitals.   CPC.    Plan reviewed with Dr. Medina

## 2025-01-08 VITALS
RESPIRATION RATE: 17 BRPM | HEART RATE: 92 BPM | OXYGEN SATURATION: 100 % | SYSTOLIC BLOOD PRESSURE: 116 MMHG | DIASTOLIC BLOOD PRESSURE: 77 MMHG | TEMPERATURE: 98 F

## 2025-01-08 LAB
HCT VFR BLD CALC: 28.6 % — LOW (ref 34.5–45)
HGB BLD-MCNC: 8.5 G/DL — LOW (ref 11.5–15.5)
MCHC RBC-ENTMCNC: 19.5 PG — LOW (ref 27–34)
MCHC RBC-ENTMCNC: 29.7 G/DL — LOW (ref 32–36)
MCV RBC AUTO: 65.4 FL — LOW (ref 80–100)
NRBC # BLD: 0 /100 WBCS — SIGNIFICANT CHANGE UP (ref 0–0)
NRBC # FLD: 0.02 K/UL — HIGH (ref 0–0)
PLATELET # BLD AUTO: 282 K/UL — SIGNIFICANT CHANGE UP (ref 150–400)
RBC # BLD: 4.37 M/UL — SIGNIFICANT CHANGE UP (ref 3.8–5.2)
RBC # FLD: 18 % — HIGH (ref 10.3–14.5)
WBC # BLD: 13.89 K/UL — HIGH (ref 3.8–10.5)
WBC # FLD AUTO: 13.89 K/UL — HIGH (ref 3.8–10.5)

## 2025-01-08 RX ORDER — FERROUS SULFATE 325(65) MG
1 TABLET ORAL
Qty: 0 | Refills: 0 | DISCHARGE
Start: 2025-01-08

## 2025-01-08 RX ORDER — ASCORBIC ACID 1000 MG
1 TABLET ORAL
Qty: 0 | Refills: 0 | DISCHARGE
Start: 2025-01-08

## 2025-01-08 RX ADMIN — Medication 600 MILLIGRAM(S): at 08:00

## 2025-01-08 RX ADMIN — Medication 600 MILLIGRAM(S): at 09:00

## 2025-01-08 RX ADMIN — SODIUM CHLORIDE 3 MILLILITER(S): 9 INJECTION, SOLUTION INTRAMUSCULAR; INTRAVENOUS; SUBCUTANEOUS at 00:09

## 2025-01-08 NOTE — PROGRESS NOTE ADULT - ASSESSMENT
Patient seen at bedside resting comfortably offers no current complaints.  Ambulating and voiding without difficulty.  Passing flatus and tolerating regular diet.  both breast/bottle feeding.  Denies HA, CP, SOB, N/V/D, dizziness, palpitations, worsening abdominal pain, worsening vaginal bleeding, or any other concerns.      Vital Signs Last 24 Hrs  T(C): 36.7 (2025 05:56), Max: 36.7 (2025 14:32)  T(F): 98.1 (2025 05:56), Max: 98.1 (2025 14:32)  HR: 89 (2025 05:56) (89 - 115)  BP: 133/84 (2025 05:56) (113/83 - 133/84)  BP(mean): --  RR: 17 (2025 05:56) (17 - 17)  SpO2: 100% (2025 05:56) (100% - 100%)    Parameters below as of 2025 05:56  Patient On (Oxygen Delivery Method): room air        Physical Exam:     Gen: A&Ox 3, NAD  Chest: CTA B/L  Cardiac: S1,S2; RRR  Breast: Soft, nontender, nonengorged  Abdomen: +BS; Soft, nontender, ND; Fundus firm below umbilicus  Gyn: Min lochia  Ext: Nontender, DTRS 2+, no worsening edema                          8.5    13.89 )-----------( 282      ( 2025 05:30 )             28.6        A/P: 19yr old F PPD#2 s/p  2025     #Anemia   -   - Starting H/H 9.2/31.4 --> 8.5/28/.6  - S/P Venofer x 1   - Iron supplements   - Pt is asymptomatic       #PP   - Meeting PP milestones   - Pain management PRN  - Discharge home with instructions  - Follow up in office in 5-6 weeks for postpartum visit  - Breastfeeding encouraged     -d/w dr Jose Beltran NP

## 2025-01-21 ENCOUNTER — TRANSCRIPTION ENCOUNTER (OUTPATIENT)
Age: 20
End: 2025-01-21